# Patient Record
Sex: MALE | Race: WHITE | NOT HISPANIC OR LATINO | Employment: FULL TIME | URBAN - METROPOLITAN AREA
[De-identification: names, ages, dates, MRNs, and addresses within clinical notes are randomized per-mention and may not be internally consistent; named-entity substitution may affect disease eponyms.]

---

## 2017-10-11 ENCOUNTER — HOSPITAL ENCOUNTER (EMERGENCY)
Facility: HOSPITAL | Age: 22
Discharge: HOME | End: 2017-10-11
Attending: EMERGENCY MEDICINE
Payer: COMMERCIAL

## 2017-10-11 VITALS
TEMPERATURE: 98 F | BODY MASS INDEX: 26.66 KG/M2 | SYSTOLIC BLOOD PRESSURE: 145 MMHG | OXYGEN SATURATION: 100 % | HEIGHT: 69 IN | HEART RATE: 86 BPM | RESPIRATION RATE: 16 BRPM | DIASTOLIC BLOOD PRESSURE: 77 MMHG | WEIGHT: 180 LBS

## 2017-10-11 DIAGNOSIS — K92.1 BLACK STOOL: ICD-10-CM

## 2017-10-11 DIAGNOSIS — R10.13 EPIGASTRIC PAIN: Primary | ICD-10-CM

## 2017-10-11 LAB
ALBUMIN SERPL BCP-MCNC: 4.4 G/DL
ALP SERPL-CCNC: 76 U/L
ALT SERPL W/O P-5'-P-CCNC: 49 U/L
AMYLASE SERPL-CCNC: 57 U/L
ANION GAP SERPL CALC-SCNC: 9 MMOL/L
AST SERPL-CCNC: 27 U/L
BASOPHILS # BLD AUTO: 0.03 K/UL
BASOPHILS NFR BLD: 0.3 %
BILIRUB SERPL-MCNC: 0.9 MG/DL
BUN SERPL-MCNC: 19 MG/DL
CALCIUM SERPL-MCNC: 9.7 MG/DL
CHLORIDE SERPL-SCNC: 104 MMOL/L
CO2 SERPL-SCNC: 30 MMOL/L
CREAT SERPL-MCNC: 1.3 MG/DL
DIFFERENTIAL METHOD: ABNORMAL
EOSINOPHIL # BLD AUTO: 0.2 K/UL
EOSINOPHIL NFR BLD: 1.8 %
ERYTHROCYTE [DISTWIDTH] IN BLOOD BY AUTOMATED COUNT: 11.9 %
EST. GFR  (AFRICAN AMERICAN): >60 ML/MIN/1.73 M^2
EST. GFR  (NON AFRICAN AMERICAN): >60 ML/MIN/1.73 M^2
GLUCOSE SERPL-MCNC: 77 MG/DL
HCT VFR BLD AUTO: 46.6 %
HGB BLD-MCNC: 17.2 G/DL
INR PPP: 1
LIPASE SERPL-CCNC: 32 U/L
LYMPHOCYTES # BLD AUTO: 2.9 K/UL
LYMPHOCYTES NFR BLD: 28.5 %
MCH RBC QN AUTO: 30.9 PG
MCHC RBC AUTO-ENTMCNC: 36.9 G/DL
MCV RBC AUTO: 84 FL
MONOCYTES # BLD AUTO: 0.9 K/UL
MONOCYTES NFR BLD: 9 %
NEUTROPHILS # BLD AUTO: 6.2 K/UL
NEUTROPHILS NFR BLD: 60.4 %
PLATELET # BLD AUTO: 288 K/UL
PMV BLD AUTO: 9.1 FL
POTASSIUM SERPL-SCNC: 4.1 MMOL/L
PROT SERPL-MCNC: 7.8 G/DL
PROTHROMBIN TIME: 10.9 SEC
RBC # BLD AUTO: 5.56 M/UL
SODIUM SERPL-SCNC: 143 MMOL/L
WBC # BLD AUTO: 9.97 K/UL

## 2017-10-11 PROCEDURE — S0028 INJECTION, FAMOTIDINE, 20 MG: HCPCS | Performed by: EMERGENCY MEDICINE

## 2017-10-11 PROCEDURE — 96374 THER/PROPH/DIAG INJ IV PUSH: CPT

## 2017-10-11 PROCEDURE — 85610 PROTHROMBIN TIME: CPT

## 2017-10-11 PROCEDURE — 83690 ASSAY OF LIPASE: CPT

## 2017-10-11 PROCEDURE — 25500020 PHARM REV CODE 255: Performed by: EMERGENCY MEDICINE

## 2017-10-11 PROCEDURE — 80053 COMPREHEN METABOLIC PANEL: CPT

## 2017-10-11 PROCEDURE — 85025 COMPLETE CBC W/AUTO DIFF WBC: CPT

## 2017-10-11 PROCEDURE — 82150 ASSAY OF AMYLASE: CPT

## 2017-10-11 PROCEDURE — 25000003 PHARM REV CODE 250: Performed by: EMERGENCY MEDICINE

## 2017-10-11 PROCEDURE — 96361 HYDRATE IV INFUSION ADD-ON: CPT

## 2017-10-11 PROCEDURE — 99284 EMERGENCY DEPT VISIT MOD MDM: CPT | Mod: 25

## 2017-10-11 RX ORDER — METRONIDAZOLE 500 MG/1
500 TABLET ORAL EVERY 12 HOURS
Qty: 14 TABLET | Refills: 0 | Status: SHIPPED | OUTPATIENT
Start: 2017-10-11 | End: 2017-10-18

## 2017-10-11 RX ORDER — LOPERAMIDE HYDROCHLORIDE 2 MG/1
2 CAPSULE ORAL 4 TIMES DAILY PRN
Qty: 20 CAPSULE | Refills: 0 | Status: SHIPPED | OUTPATIENT
Start: 2017-10-11 | End: 2017-10-16

## 2017-10-11 RX ORDER — FAMOTIDINE 20 MG/1
20 TABLET, FILM COATED ORAL 2 TIMES DAILY
Qty: 20 TABLET | Refills: 0 | Status: ON HOLD | OUTPATIENT
Start: 2017-10-11 | End: 2020-12-07 | Stop reason: HOSPADM

## 2017-10-11 RX ORDER — DICYCLOMINE HYDROCHLORIDE 20 MG/1
20 TABLET ORAL 2 TIMES DAILY
Qty: 20 TABLET | Refills: 0 | Status: SHIPPED | OUTPATIENT
Start: 2017-10-11 | End: 2017-11-10

## 2017-10-11 RX ORDER — ONDANSETRON 4 MG/1
4 TABLET, ORALLY DISINTEGRATING ORAL EVERY 12 HOURS PRN
Qty: 12 TABLET | Refills: 0 | Status: SHIPPED | OUTPATIENT
Start: 2017-10-11 | End: 2017-10-14

## 2017-10-11 RX ORDER — FAMOTIDINE 10 MG/ML
20 INJECTION INTRAVENOUS
Status: COMPLETED | OUTPATIENT
Start: 2017-10-11 | End: 2017-10-11

## 2017-10-11 RX ADMIN — IOHEXOL 15 ML: 300 INJECTION, SOLUTION INTRAVENOUS at 10:10

## 2017-10-11 RX ADMIN — IOHEXOL 70 ML: 350 INJECTION, SOLUTION INTRAVENOUS at 10:10

## 2017-10-11 RX ADMIN — FAMOTIDINE 20 MG: 10 INJECTION, SOLUTION INTRAVENOUS at 09:10

## 2017-10-11 RX ADMIN — SODIUM CHLORIDE 1000 ML: 0.9 INJECTION, SOLUTION INTRAVENOUS at 10:10

## 2017-10-12 NOTE — ED TRIAGE NOTES
"Pt reports to ED via personal transportation with c/o "black diarrhea" and generalized stomach ache ongoing for 1 week; pt also reports 1 episode of dark black emesis 2 days ago; pt reports to having 5-7 bowel movements a day; pt denies taking any new medication, ASA, or Goodys powder; pt denies any hx with these symptoms; pt AAOx4  "

## 2017-10-12 NOTE — ED PROVIDER NOTES
"Encounter Date: 10/11/2017    SCRIBE #1 NOTE: I, Edd Abbott, am scribing for, and in the presence of,  Aris Nelson MD. I have scribed the following portions of the note - Other sections scribed: HPI, ROS.       History     Chief Complaint   Patient presents with    Rectal Bleeding     black stools with every BM x 1 week, feels weak, abd pain, vomiting 2 days ago; watery stools; denies fever     CC: Diarrhea; Dark Stools    HPI: This 21 y.o. male presents to the ED c/o diarrhea with dark stools for 1x week accompanied by lower abdominal pain, generalized fatigue, nausea, increased flatulence, and 1x episode of "black" emesis 2 days ago. Pt denies history of similar symptoms. Symptoms are acute in onset and severe (9/10). Pt denies any treatment with Ibuprofen or Pepto-Bismol. Pt denies any recent EtOH consumption. Pt denies history of stomach ulcers or hemorrhoids. Pt denies any fevers, sore throat, dysuria, or any other associated symptoms. Pt has no modifying factors. Pt has no prior treatment.       The history is provided by the patient. No  was used.     Review of patient's allergies indicates:  No Known Allergies  History reviewed. No pertinent past medical history.  Past Surgical History:   Procedure Laterality Date    WISDOM TOOTH EXTRACTION  2017     History reviewed. No pertinent family history.  Social History   Substance Use Topics    Smoking status: Never Smoker    Smokeless tobacco: Never Used    Alcohol use No     Review of Systems   Constitutional: Positive for fatigue. Negative for fever.   HENT: Negative for sore throat.    Respiratory: Negative for shortness of breath.    Cardiovascular: Negative for chest pain.   Gastrointestinal: Positive for abdominal pain (lower), diarrhea (with dark stool), nausea and vomiting (1x episode of "black" emesis).   Genitourinary: Negative for dysuria.   Musculoskeletal: Negative for back pain.   Skin: Negative for rash. "   Neurological: Negative for weakness.   Hematological: Does not bruise/bleed easily.       Physical Exam     Initial Vitals [10/11/17 1921]   BP Pulse Resp Temp SpO2   (!) 150/82 89 16 98.4 °F (36.9 °C) 98 %      MAP       104.67         Physical Exam    Vitals reviewed.  Constitutional: He appears well-developed and well-nourished.   HENT:   Head: Normocephalic and atraumatic.   Eyes: EOM are normal. Pupils are equal, round, and reactive to light.   Neck: Normal range of motion. Neck supple.   Cardiovascular: Normal rate, regular rhythm, normal heart sounds and intact distal pulses.   Pulmonary/Chest: Breath sounds normal. No respiratory distress. He has no wheezes. He has no rhonchi. He has no rales.   Abdominal: Soft. Bowel sounds are normal.   Genitourinary: Rectum normal. Rectal exam shows no external hemorrhoid, no internal hemorrhoid, no fissure, no tenderness, anal tone normal and guaiac negative stool. Guaiac negative stool. : Acceptable.  Musculoskeletal: Normal range of motion.   Neurological: He is alert and oriented to person, place, and time.   Skin: Skin is warm and dry.   Psychiatric: He has a normal mood and affect.         ED Course   Procedures  Labs Reviewed   CBC W/ AUTO DIFFERENTIAL - Abnormal; Notable for the following:        Result Value    MCHC 36.9 (*)     MPV 9.1 (*)     All other components within normal limits   COMPREHENSIVE METABOLIC PANEL - Abnormal; Notable for the following:     CO2 30 (*)     ALT 49 (*)     All other components within normal limits   LIPASE   PROTIME-INR   AMYLASE   POCT OCCULT BLOOD STOOL             Medical Decision Making:   History:   Old Medical Records: I decided to obtain old medical records.  Initial Assessment:   Medical decision-making:    The patient received a medical screening exam. If performed, the EKG was independently evaluated by me and is pending final cardiology evaluation.  If performed, all radiographic studies were  independently evaluated by me and are pending final radiology evaluation. If labs were ordered, they were reviewed. Vital signs are independently assessed by me.  If performed, the pulse oximetry was independently evaluated by me.  I decided to obtain the patient's past medical record.  If available, I reviewed the patient's past medical record, including most recent labs and radiology reports.    ED Management:  This is an emergent evaluation of the patient complaining of epigastric abdominal pain, black stool and vomiting.  My differential diagnosis includes mesenteric ischemia, obstruction, appendicitis, pancreatitis, cholecystitis, peptic ulcer disease,diverticulitis, colitis, volvulus, hernia, UTI, gastritis and gastroenteritis.    I decided to obtain and review the old medical record.    The patient does not clinically have gastroenteritis.  A CT scan was performed and was negative for mesenteric ischemia, obstruction, appendicitis, cholecystitis, diverticulitis, colitis, and hernia.  The patient's labs are otherwise normal. No anemia. Occult stool testing was negative.      The patient reports that symptomatically, symptoms have improved. No black stool in the ED. Tolerating PO. VS reassuring. FU with GI.    The results and physical exam findings were reviewed with the patient. Pt agrees with assessment, disposition and treatment plan and has no further questions or complaints at this time.      MITZY Nelson M.D. 10:47 PM 10/11/2017    In reviewing the results with the patient.  We've had further discussions regarding his gas.  He says it states it smells like rotten eggs.  He has frequent small burps and also smelling rotten eggs.  Patient states that he recently returned from a fishing and hunting trip.  He doesn't recall drinking any river or creek water.  I consider parasitic infection such as Giardia.  Will add on a short course of Flagyl.  Patient is still well appearing and in no acute distress  and does not require admission at this time.  I have given him strict return precautions.  On repeat abdominal exam is extremely soft, nontender, and showing no peritoneal or surgical abdomen signs.    The results and physical exam findings were reviewed with the patient. Pt agrees with assessment, disposition and treatment plan and has no further questions or complaints at this time.    MITZY Nelson M.D. 10:57 PM 10/11/2017              Scribe Attestation:   Scribe #1: I performed the above scribed service and the documentation accurately describes the services I performed. I attest to the accuracy of the note.    Attending Attestation:           Physician Attestation for Scribe:  Physician Attestation Statement for Scribe #1: I, Aris Nelson MD, reviewed documentation, as scribed by Edd Abbott in my presence, and it is both accurate and complete.                 ED Course      Clinical Impression:   The primary encounter diagnosis was Epigastric pain. A diagnosis of Black stool was also pertinent to this visit.                           Aris Nelson MD  10/11/17 9020       Aris Nelson MD  10/11/17 5841

## 2018-02-28 ENCOUNTER — OFFICE VISIT (OUTPATIENT)
Dept: URGENT CARE | Facility: CLINIC | Age: 23
End: 2018-02-28
Payer: COMMERCIAL

## 2018-02-28 VITALS
BODY MASS INDEX: 28.5 KG/M2 | DIASTOLIC BLOOD PRESSURE: 77 MMHG | WEIGHT: 193 LBS | TEMPERATURE: 100 F | HEART RATE: 81 BPM | OXYGEN SATURATION: 97 % | SYSTOLIC BLOOD PRESSURE: 127 MMHG

## 2018-02-28 DIAGNOSIS — L03.115 CELLULITIS OF RIGHT HIP: ICD-10-CM

## 2018-02-28 DIAGNOSIS — B07.9 VIRAL WART ON FINGER: ICD-10-CM

## 2018-02-28 DIAGNOSIS — L03.012 CELLULITIS OF LEFT MIDDLE FINGER: Primary | ICD-10-CM

## 2018-02-28 PROCEDURE — 99214 OFFICE O/P EST MOD 30 MIN: CPT | Mod: S$GLB,,, | Performed by: FAMILY MEDICINE

## 2018-02-28 RX ORDER — CHLORHEXIDINE GLUCONATE 40 MG/ML
SOLUTION TOPICAL DAILY PRN
Refills: 0 | Status: ON HOLD | COMMUNITY
Start: 2018-02-28 | End: 2020-12-06

## 2018-02-28 RX ORDER — SULFAMETHOXAZOLE AND TRIMETHOPRIM 800; 160 MG/1; MG/1
1 TABLET ORAL 2 TIMES DAILY
Qty: 20 TABLET | Refills: 0 | Status: SHIPPED | OUTPATIENT
Start: 2018-02-28 | End: 2018-03-10

## 2018-02-28 RX ORDER — MUPIROCIN 20 MG/G
OINTMENT TOPICAL
Qty: 22 G | Refills: 1 | Status: ON HOLD | OUTPATIENT
Start: 2018-02-28 | End: 2020-12-06

## 2018-03-01 NOTE — PROGRESS NOTES
Subjective:       Patient ID: Nathaniel Acharya is a 22 y.o. male.    Vitals:  weight is 87.5 kg (193 lb). His temperature is 100 °F (37.8 °C). His blood pressure is 127/77 and his pulse is 81. His oxygen saturation is 97%.     Chief Complaint: Abscess    Pt has an abscess on the right hip area. Pt tried to pop it and its red concerned about infection, been there for 3 days. Pt also had a blister on the left middle finger, very red and filled with fluid. The finger hurts bad like a 8 and has been like this for 7 days.      Abscess   Chronicity:  New  Abscess location: hip.  Associated Symptoms: a fever, no chills  Characteristics: redness      Review of Systems   Constitution: Positive for fever. Negative for chills.   HENT: Negative for sore throat.    Eyes: Negative for blurred vision.   Cardiovascular: Negative for chest pain.   Respiratory: Negative for shortness of breath.    Skin: Negative for rash.   Musculoskeletal: Negative for back pain and joint pain.   Gastrointestinal: Negative for abdominal pain, diarrhea, nausea and vomiting.   Neurological: Negative for headaches.   Psychiatric/Behavioral: The patient is not nervous/anxious.    All other systems reviewed and are negative.      Objective:      Physical Exam   Constitutional: He is oriented to person, place, and time. He appears well-developed and well-nourished.   HENT:   Head: Normocephalic and atraumatic.   Eyes: EOM are normal. Pupils are equal, round, and reactive to light.   Neck: Normal range of motion. Neck supple.   Cardiovascular: Normal rate and regular rhythm.    Pulmonary/Chest: Effort normal.   Abdominal: Soft.   Musculoskeletal: Normal range of motion.        Left hand: He exhibits swelling. He exhibits normal range of motion. Normal sensation noted.        Hands:  Erythema and edema surrounding left middle fingernail. Viral wart on the dorsum of the left index finger.   Neurological: He is alert and oriented to person, place, and time.    Skin: There is erythema.        Erythema dn edema on the right lateral hip       Assessment:       1. Cellulitis of left middle finger    2. Cellulitis of right hip    3. Viral wart on finger        Plan:         Cellulitis of left middle finger  -     mupirocin (BACTROBAN) 2 % ointment; Apply to affected area 3 times daily  Dispense: 22 g; Refill: 1  -     sulfamethoxazole-trimethoprim 800-160mg (BACTRIM DS) 800-160 mg Tab; Take 1 tablet by mouth 2 (two) times daily.  Dispense: 20 tablet; Refill: 0  -     chlorhexidine (HIBICLENS) 4 % external liquid; Apply topically daily as needed.; Refill: 0    Cellulitis of right hip  -     mupirocin (BACTROBAN) 2 % ointment; Apply to affected area 3 times daily  Dispense: 22 g; Refill: 1  -     sulfamethoxazole-trimethoprim 800-160mg (BACTRIM DS) 800-160 mg Tab; Take 1 tablet by mouth 2 (two) times daily.  Dispense: 20 tablet; Refill: 0  -     chlorhexidine (HIBICLENS) 4 % external liquid; Apply topically daily as needed.; Refill: 0    Viral wart on finger  -     Ambulatory referral to Dermatology      Patient Instructions     Paronychia of the Finger or Toe  Paronychia is an infection near a fingernail or toenail. It usually occurs when an opening in the cuticle or an ingrown toenail lets bacteria under the skin.  The infection will need to be drained if pus is present. If the infection has been caught early, you may need only antibiotic treatment. Healing will take about 1 to 2 weeks.  Home care  Follow these guidelines when caring for yourself at home:  · Clean and soak the toe or finger. Do this 2 times a day for the first 3 days. To do so:  ¨ Soak your foot or hand in a tub of warm water for 5 minutes. Or hold your toe or finger under a faucet of warm running water for 5 minutes.  ¨ Clean any crust away with soap and water using a cotton swab.  ¨ Put antibiotic ointment on the infected area.  · Change the dressing daily or any time it gets dirty.  · If you were given  antibiotics, take them as directed until they are all gone.  · If your infection is on a toe, wear comfortable shoes with a lot of toe room. You can also wear open-toed sandals while your toe heals.  · You may use over-the-counter medicine (acetaminophen or ibuprofen to help with pain, unless another medicine was prescribed. If you have chronic liver or kidney disease, talk with your healthcare provider before using these medicines. Also talk with your provider if you've had a stomach ulcer or GI (gastrointestinal) bleeding.  Prevention  The following can prevent paronychia:  · Avoid cutting or playing with your cuticles at home.  · Don't bite your nails.  · Don't suck on your thumbs or fingers.  Follow-up care  Follow up with your healthcare provider, or as advised.  When to seek medical advice  Call your healthcare provider right away if any of these occur:  · Redness, pain, or swelling of the finger or toe gets worse  · Red streaks in the skin leading away from the wound  · Pus or fluid draining from the nail area  · Fever of 100.4ºF (38ºC) or higher, or as directed by your provider  Date Last Reviewed: 8/1/2016 © 2000-2017 [employer]. [address] [place] [address]. All rights reserved. This information is not intended as a substitute for professional medical care. Always follow your healthcare professional's instructions.        Cellulitis  Cellulitis is an infection of the deep layers of skin. A break in the skin, such as a cut or scratch, can let bacteria under the skin. If the bacteria get to deep layers of the skin, it can be serious. If not treated, cellulitis can get into the bloodstream and lymph nodes. The infection can then spread throughout the body. This causes serious illness.  Cellulitis causes the affected skin to become red, swollen, warm, and sore. The reddened areas have a visible border. An open sore may leak fluid (pus). You may have a fever, chills, and  pain.  Cellulitis is treated with antibiotics taken for 7 to 10 days. An open sore may be cleaned and covered with cool wet gauze. Symptoms should get better 1 to 2 days after treatment is started. Make sure to take all the antibiotics for the full number of days until they are gone. Keep taking the medicine even if your symptoms go away.  Home care  Follow these tips:  · Limit the use of the part of your body with cellulitis.   · If the infection is on your leg, keep your leg raised while sitting. This will help to reduce swelling.  · Take all of the antibiotic medicine exactly as directed until it is gone. Do not miss any doses, especially during the first 7 days. Dont stop taking the medicine when your symptoms get better.  · Keep the affected area clean and dry.  · Wash your hands with soap and warm water before and after touching your skin. Anyone else who touches your skin should also wash his or her hands. Don't share towels.  Follow-up care  Follow up with your healthcare provider, or as advised. If your infection does not go away on the first antibiotic, your healthcare provider will prescribe a different one.  When to seek medical advice  Call your healthcare provider right away if any of these occur:  · Red areas that spread  · Swelling or pain that gets worse  · Fluid leaking from the skin (pus)  · Fever higher of 100.4º F (38.0º C) or higher after 2 days on antibiotics  Date Last Reviewed: 9/1/2016  © 4209-9450 [employer]. [address] [place] [address], [place] [address]. All rights reserved. This information is not intended as a substitute for professional medical care. Always follow your healthcare professional's instructions.

## 2018-03-01 NOTE — PATIENT INSTRUCTIONS

## 2019-05-15 ENCOUNTER — HOSPITAL ENCOUNTER (EMERGENCY)
Facility: HOSPITAL | Age: 24
Discharge: HOME | End: 2019-05-15
Attending: EMERGENCY MEDICINE
Payer: COMMERCIAL

## 2019-05-15 VITALS
HEART RATE: 109 BPM | RESPIRATION RATE: 19 BRPM | WEIGHT: 200 LBS | DIASTOLIC BLOOD PRESSURE: 95 MMHG | BODY MASS INDEX: 29.62 KG/M2 | TEMPERATURE: 100 F | HEIGHT: 69 IN | OXYGEN SATURATION: 100 % | SYSTOLIC BLOOD PRESSURE: 147 MMHG

## 2019-05-15 DIAGNOSIS — J02.9 EXUDATIVE PHARYNGITIS: Primary | ICD-10-CM

## 2019-05-15 DIAGNOSIS — K52.9 GASTROENTERITIS: ICD-10-CM

## 2019-05-15 LAB
ALBUMIN SERPL-MCNC: 4.2 G/DL (ref 3.3–5.5)
ALBUMIN SERPL-MCNC: 4.2 G/DL (ref 3.3–5.5)
ALP SERPL-CCNC: 73 U/L (ref 42–141)
ALP SERPL-CCNC: 74 U/L (ref 42–141)
BILIRUB SERPL-MCNC: 1.5 MG/DL (ref 0.2–1.6)
BILIRUB SERPL-MCNC: 1.6 MG/DL (ref 0.2–1.6)
BUN SERPL-MCNC: 8 MG/DL (ref 7–22)
CALCIUM SERPL-MCNC: 9.3 MG/DL (ref 8–10.3)
CHLORIDE SERPL-SCNC: 104 MMOL/L (ref 98–108)
CREAT SERPL-MCNC: 0.9 MG/DL (ref 0.6–1.2)
CTP QC/QA: YES
CTP QC/QA: YES
FLUAV AG NPH QL: NEGATIVE
FLUBV AG NPH QL: NEGATIVE
GLUCOSE SERPL-MCNC: 100 MG/DL (ref 73–118)
POC ALT (SGPT): 28 U/L (ref 10–47)
POC ALT (SGPT): 36 U/L (ref 10–47)
POC AMYLASE: 37 U/L (ref 14–97)
POC AST (SGOT): 34 U/L (ref 11–38)
POC AST (SGOT): 35 U/L (ref 11–38)
POC GGT: 49 U/L (ref 5–65)
POC TCO2: 30 MMOL/L (ref 18–33)
POTASSIUM BLD-SCNC: 4 MMOL/L (ref 3.6–5.1)
PROTEIN, POC: 7.7 G/DL (ref 6.4–8.1)
PROTEIN, POC: 7.8 G/DL (ref 6.4–8.1)
S PYO RRNA THROAT QL PROBE: NEGATIVE
SODIUM BLD-SCNC: 140 MMOL/L (ref 128–145)

## 2019-05-15 PROCEDURE — 87081 CULTURE SCREEN ONLY: CPT

## 2019-05-15 PROCEDURE — 96374 THER/PROPH/DIAG INJ IV PUSH: CPT | Mod: ER

## 2019-05-15 PROCEDURE — 87804 INFLUENZA ASSAY W/OPTIC: CPT | Mod: ER

## 2019-05-15 PROCEDURE — 82150 ASSAY OF AMYLASE: CPT | Mod: ER

## 2019-05-15 PROCEDURE — 96375 TX/PRO/DX INJ NEW DRUG ADDON: CPT | Mod: ER

## 2019-05-15 PROCEDURE — 80053 COMPREHEN METABOLIC PANEL: CPT | Mod: ER

## 2019-05-15 PROCEDURE — 96361 HYDRATE IV INFUSION ADD-ON: CPT | Mod: ER

## 2019-05-15 PROCEDURE — 85025 COMPLETE CBC W/AUTO DIFF WBC: CPT | Mod: ER

## 2019-05-15 PROCEDURE — S0028 INJECTION, FAMOTIDINE, 20 MG: HCPCS | Mod: ER | Performed by: EMERGENCY MEDICINE

## 2019-05-15 PROCEDURE — 25000003 PHARM REV CODE 250: Mod: ER | Performed by: EMERGENCY MEDICINE

## 2019-05-15 PROCEDURE — 96372 THER/PROPH/DIAG INJ SC/IM: CPT | Mod: ER

## 2019-05-15 PROCEDURE — 63600175 PHARM REV CODE 636 W HCPCS: Mod: ER | Performed by: EMERGENCY MEDICINE

## 2019-05-15 PROCEDURE — 99284 EMERGENCY DEPT VISIT MOD MDM: CPT | Mod: 25,ER

## 2019-05-15 PROCEDURE — 87880 STREP A ASSAY W/OPTIC: CPT | Mod: ER

## 2019-05-15 RX ORDER — PROMETHAZINE HYDROCHLORIDE AND DEXTROMETHORPHAN HYDROBROMIDE 6.25; 15 MG/5ML; MG/5ML
5 SYRUP ORAL 3 TIMES DAILY PRN
Qty: 200 ML | Refills: 0 | Status: SHIPPED | OUTPATIENT
Start: 2019-05-15 | End: 2019-05-25

## 2019-05-15 RX ORDER — ACETAMINOPHEN 500 MG
1000 TABLET ORAL EVERY 6 HOURS PRN
Qty: 30 TABLET | Refills: 0 | Status: ON HOLD | OUTPATIENT
Start: 2019-05-15 | End: 2020-12-06

## 2019-05-15 RX ORDER — LIDOCAINE HYDROCHLORIDE 20 MG/ML
SOLUTION OROPHARYNGEAL
Qty: 30 ML | Refills: 0 | Status: ON HOLD | OUTPATIENT
Start: 2019-05-15 | End: 2020-12-06

## 2019-05-15 RX ORDER — DIPHENHYDRAMINE HYDROCHLORIDE 50 MG/ML
25 INJECTION INTRAMUSCULAR; INTRAVENOUS
Status: COMPLETED | OUTPATIENT
Start: 2019-05-15 | End: 2019-05-15

## 2019-05-15 RX ORDER — IBUPROFEN 600 MG/1
600 TABLET ORAL EVERY 6 HOURS PRN
Qty: 20 TABLET | Refills: 0 | Status: ON HOLD | OUTPATIENT
Start: 2019-05-15 | End: 2020-12-06

## 2019-05-15 RX ORDER — PROMETHAZINE HYDROCHLORIDE 25 MG/1
25 SUPPOSITORY RECTAL EVERY 6 HOURS PRN
Qty: 10 SUPPOSITORY | Refills: 0 | Status: ON HOLD | OUTPATIENT
Start: 2019-05-15 | End: 2020-12-06

## 2019-05-15 RX ORDER — PROMETHAZINE HYDROCHLORIDE 25 MG/ML
25 INJECTION, SOLUTION INTRAMUSCULAR; INTRAVENOUS
Status: COMPLETED | OUTPATIENT
Start: 2019-05-15 | End: 2019-05-15

## 2019-05-15 RX ORDER — FLUTICASONE PROPIONATE 50 MCG
1 SPRAY, SUSPENSION (ML) NASAL 2 TIMES DAILY
Qty: 1 BOTTLE | Refills: 0 | Status: ON HOLD | OUTPATIENT
Start: 2019-05-15 | End: 2020-12-07 | Stop reason: HOSPADM

## 2019-05-15 RX ORDER — KETOROLAC TROMETHAMINE 30 MG/ML
15 INJECTION, SOLUTION INTRAMUSCULAR; INTRAVENOUS
Status: COMPLETED | OUTPATIENT
Start: 2019-05-15 | End: 2019-05-15

## 2019-05-15 RX ORDER — FAMOTIDINE 10 MG/ML
40 INJECTION INTRAVENOUS
Status: COMPLETED | OUTPATIENT
Start: 2019-05-15 | End: 2019-05-15

## 2019-05-15 RX ADMIN — PROMETHAZINE HYDROCHLORIDE 25 MG: 25 INJECTION INTRAMUSCULAR; INTRAVENOUS at 11:05

## 2019-05-15 RX ADMIN — KETOROLAC TROMETHAMINE 15 MG: 30 INJECTION, SOLUTION INTRAMUSCULAR at 10:05

## 2019-05-15 RX ADMIN — PENICILLIN G BENZATHINE 1.2 MILLION UNITS: 1200000 INJECTION, SUSPENSION INTRAMUSCULAR at 11:05

## 2019-05-15 RX ADMIN — DIPHENHYDRAMINE HYDROCHLORIDE 25 MG: 50 INJECTION, SOLUTION INTRAMUSCULAR; INTRAVENOUS at 11:05

## 2019-05-15 RX ADMIN — SODIUM CHLORIDE 1000 ML: 0.9 INJECTION, SOLUTION INTRAVENOUS at 10:05

## 2019-05-15 RX ADMIN — FAMOTIDINE 40 MG: 10 INJECTION INTRAVENOUS at 10:05

## 2019-05-15 NOTE — ED PROVIDER NOTES
Encounter Date: 5/15/2019    SCRIBE #1 NOTE: I, Kari Jordan, am scribing for, and in the presence of, Dr. Brown.       History     Chief Complaint   Patient presents with    Vomiting     onset yesterday, multiple times    Cough     onset yesterday, states now coughting up blood    Nasal Congestion     Nathaniel Acharya is a 23 y.o. male who presents to the ED complaining of sore throat that began yesterday. Pt endorses cough with blood streaked yellow sputum, congestion, nausea, vomiting, diarrhea, fever (101), and sleep disturbance.  Patient states he has been having nausea vomiting and diarrhea for 2 days.  Crampy belly pain.  Pt reports he has not taken any medication for symptoms. He states his entire family had strep throat last week. Pt denies use of alcohol, tobacco, and illicit drugs.     Pt is requesting phenergan-codeine as he states his family members with similar symptoms took it with rapid relief.    The history is provided by the patient. No  was used.     Review of patient's allergies indicates:  No Known Allergies  Past Medical History:   Diagnosis Date    Anxiety     PTSD (post-traumatic stress disorder)      Past Surgical History:   Procedure Laterality Date    WISDOM TOOTH EXTRACTION  2017     History reviewed. No pertinent family history.  Social History     Tobacco Use    Smoking status: Never Smoker    Smokeless tobacco: Never Used   Substance Use Topics    Alcohol use: No    Drug use: No     Review of Systems   Constitutional: Positive for fever (101).   HENT: Positive for congestion and sore throat.    Respiratory: Positive for cough (with blood streaked yellow sputum). Negative for shortness of breath.    Cardiovascular: Negative for chest pain.   Gastrointestinal: Positive for diarrhea, nausea and vomiting.   Genitourinary: Negative for dysuria.   Musculoskeletal: Negative for back pain.   Skin: Negative for rash.   Neurological: Negative for weakness.    Hematological: Does not bruise/bleed easily.   Psychiatric/Behavioral: Positive for sleep disturbance.   All other systems reviewed and are negative.      Physical Exam     Initial Vitals [05/15/19 0947]   BP Pulse Resp Temp SpO2   (!) 148/81 95 19 99.6 °F (37.6 °C) 97 %      MAP       --         Physical Exam    Nursing note and vitals reviewed.  Constitutional: He appears well-developed and well-nourished. He is not diaphoretic. No distress.   HENT:   Head: Normocephalic and atraumatic.   Right Ear: Tympanic membrane and external ear normal.   Left Ear: Tympanic membrane and external ear normal.   Nose: Mucosal edema and rhinorrhea present.   Mouth/Throat: Uvula is midline. Oropharyngeal exudate, posterior oropharyngeal edema and posterior oropharyngeal erythema present.   Eyes: EOM are normal. Pupils are equal, round, and reactive to light. Right eye exhibits no discharge. Left eye exhibits no discharge.   Neck: Normal range of motion. Neck supple.   Cardiovascular: Normal rate, regular rhythm, normal heart sounds and intact distal pulses. Exam reveals no gallop and no friction rub.    No murmur heard.  Pulmonary/Chest: Breath sounds normal. No respiratory distress. He has no wheezes. He has no rhonchi. He has no rales. He exhibits no tenderness.   Abdominal: Soft. Bowel sounds are normal. He exhibits no distension and no mass. There is no tenderness. There is no rebound and no guarding.   Musculoskeletal: Normal range of motion.   Neurological: He is alert and oriented to person, place, and time. No cranial nerve deficit or sensory deficit. GCS score is 15. GCS eye subscore is 4. GCS verbal subscore is 5. GCS motor subscore is 6.   Skin: Skin is warm and dry. No rash noted. No pallor.   Psychiatric: He has a normal mood and affect.         ED Course   Procedures  Labs Reviewed   CULTURE, STREP A,  THROAT   POCT INFLUENZA A/B   POCT RAPID STREP A   POCT CBC   POCT CMP   POCT AMYLASE   POCT LIVER PANEL   POCT  CMP     CBC white blood cell count 10.4, hemoglobin 15.9, hematocrit 46.3 and platelets 198.  Amylase is 37.  CMP sodium 140, potassium 4, bicarb 30, chloride 104, glucose 100, BUN 8 and creatinine 0.9         Imaging Results          X-Ray Chest PA And Lateral (Final result)  Result time 05/15/19 10:38:01    Final result by Lorenza Weiner MD (05/15/19 10:38:01)                 Impression:      No acute abnormality.      Electronically signed by: Lorenza Weiner MD  Date:    05/15/2019  Time:    10:38             Narrative:    EXAMINATION:  XR CHEST PA AND LATERAL    CLINICAL HISTORY:  pain;    TECHNIQUE:  PA and lateral views of the chest were performed.    COMPARISON:  None    FINDINGS:  The lungs are clear, with normal appearance of pulmonary vasculature and no pleural effusion or pneumothorax.    The cardiac silhouette is normal in size. The hilar and mediastinal contours are unremarkable.    Bones are intact.                                 Medical Decision Making:   Clinical Tests:   Lab Tests: Ordered and Reviewed  Radiological Study: Ordered and Reviewed    Medical decision making   Chief complaint: sore throat  Treatment in the ED Physical Exam, Phenergan, Benadryl, Bicillin, Toradol, Pepcid, and IV fluids  Patient reports feeling much better after medication.    Patient tolerating p.o. without difficulty.  Patient continues to request Phenergan with codeine.  Discussed with patient I feel Phenergan DM is a better choice as it is non addictive.  Discussed labs, and imaging results with the patient.  Fill and take prescriptions as directed.  Return to the ED if symptoms worsen or do not resolve.   Answered questions and discussed discharge plan.    Patient feels better and is ready for discharge.  Follow up with PCP/specialist in 1 day.            Scribe Attestation:   Scribe #1: I performed the above scribed service and the documentation accurately describes the services I performed. I attest to the  accuracy of the note.     I, Dr. Ngoc Brown, personally performed the services described in this documentation. This document was produced by a scribe under my direction and in my presence. All medical record entries made by the scribe were at my direction and in my presence.  I have reviewed the chart and agree that the record reflects my personal performance and is accurate and complete. gNoc Brown DO.     05/15/2019 12:09 PM             Clinical Impression:     1. Exudative pharyngitis    2. Gastroenteritis                                   Ngoc Brown,   05/15/19 1212       Ngoc Brown,   05/15/19 6640

## 2019-05-17 LAB — BACTERIA THROAT CULT: NORMAL

## 2020-12-05 ENCOUNTER — HOSPITAL ENCOUNTER (EMERGENCY)
Facility: HOSPITAL | Age: 25
Discharge: PSYCHIATRIC OR SUBSTANCE USE FACILITY | End: 2020-12-06
Attending: EMERGENCY MEDICINE
Payer: COMMERCIAL

## 2020-12-05 DIAGNOSIS — Z00.8 MEDICAL CLEARANCE FOR PSYCHIATRIC ADMISSION: ICD-10-CM

## 2020-12-05 DIAGNOSIS — R45.851 SUICIDAL IDEATION: Primary | ICD-10-CM

## 2020-12-05 LAB
ALBUMIN SERPL BCP-MCNC: 4.2 G/DL (ref 3.5–5.2)
ALP SERPL-CCNC: 81 U/L (ref 55–135)
ALT SERPL W/O P-5'-P-CCNC: 48 U/L (ref 10–44)
AMPHET+METHAMPHET UR QL: NEGATIVE
ANION GAP SERPL CALC-SCNC: 11 MMOL/L (ref 8–16)
APAP SERPL-MCNC: <3 UG/ML (ref 10–20)
AST SERPL-CCNC: 31 U/L (ref 10–40)
BARBITURATES UR QL SCN>200 NG/ML: NEGATIVE
BASOPHILS # BLD AUTO: 0.02 K/UL (ref 0–0.2)
BASOPHILS NFR BLD: 0.2 % (ref 0–1.9)
BENZODIAZ UR QL SCN>200 NG/ML: NORMAL
BILIRUB SERPL-MCNC: 0.6 MG/DL (ref 0.1–1)
BILIRUB UR QL STRIP: NEGATIVE
BUN SERPL-MCNC: 17 MG/DL (ref 6–20)
BZE UR QL SCN: NEGATIVE
CALCIUM SERPL-MCNC: 8.9 MG/DL (ref 8.7–10.5)
CANNABINOIDS UR QL SCN: NORMAL
CHLORIDE SERPL-SCNC: 106 MMOL/L (ref 95–110)
CLARITY UR REFRACT.AUTO: CLEAR
CO2 SERPL-SCNC: 24 MMOL/L (ref 23–29)
COLOR UR AUTO: YELLOW
CREAT SERPL-MCNC: 1.2 MG/DL (ref 0.5–1.4)
CREAT UR-MCNC: 212 MG/DL (ref 23–375)
CTP QC/QA: YES
DIFFERENTIAL METHOD: ABNORMAL
EOSINOPHIL # BLD AUTO: 0.1 K/UL (ref 0–0.5)
EOSINOPHIL NFR BLD: 0.8 % (ref 0–8)
ERYTHROCYTE [DISTWIDTH] IN BLOOD BY AUTOMATED COUNT: 11.9 % (ref 11.5–14.5)
EST. GFR  (AFRICAN AMERICAN): >60 ML/MIN/1.73 M^2
EST. GFR  (NON AFRICAN AMERICAN): >60 ML/MIN/1.73 M^2
ETHANOL SERPL-MCNC: <10 MG/DL
GLUCOSE SERPL-MCNC: 147 MG/DL (ref 70–110)
GLUCOSE UR QL STRIP: NEGATIVE
HCT VFR BLD AUTO: 44.7 % (ref 40–54)
HGB BLD-MCNC: 15 G/DL (ref 14–18)
HGB UR QL STRIP: NEGATIVE
IMM GRANULOCYTES # BLD AUTO: 0.04 K/UL (ref 0–0.04)
IMM GRANULOCYTES NFR BLD AUTO: 0.4 % (ref 0–0.5)
KETONES UR QL STRIP: NEGATIVE
LEUKOCYTE ESTERASE UR QL STRIP: NEGATIVE
LYMPHOCYTES # BLD AUTO: 2.1 K/UL (ref 1–4.8)
LYMPHOCYTES NFR BLD: 19.7 % (ref 18–48)
MCH RBC QN AUTO: 29.4 PG (ref 27–31)
MCHC RBC AUTO-ENTMCNC: 33.6 G/DL (ref 32–36)
MCV RBC AUTO: 88 FL (ref 82–98)
METHADONE UR QL SCN>300 NG/ML: NEGATIVE
MONOCYTES # BLD AUTO: 0.5 K/UL (ref 0.3–1)
MONOCYTES NFR BLD: 4.6 % (ref 4–15)
NEUTROPHILS # BLD AUTO: 7.9 K/UL (ref 1.8–7.7)
NEUTROPHILS NFR BLD: 74.3 % (ref 38–73)
NITRITE UR QL STRIP: NEGATIVE
NRBC BLD-RTO: 0 /100 WBC
OPIATES UR QL SCN: NEGATIVE
PCP UR QL SCN>25 NG/ML: NEGATIVE
PH UR STRIP: 6 [PH] (ref 5–8)
PLATELET # BLD AUTO: 276 K/UL (ref 150–350)
PMV BLD AUTO: 9.7 FL (ref 9.2–12.9)
POTASSIUM SERPL-SCNC: 3.8 MMOL/L (ref 3.5–5.1)
PROT SERPL-MCNC: 7.4 G/DL (ref 6–8.4)
PROT UR QL STRIP: NEGATIVE
RBC # BLD AUTO: 5.11 M/UL (ref 4.6–6.2)
SARS-COV-2 RDRP RESP QL NAA+PROBE: NEGATIVE
SODIUM SERPL-SCNC: 141 MMOL/L (ref 136–145)
SP GR UR STRIP: 1.02 (ref 1–1.03)
TOXICOLOGY INFORMATION: NORMAL
TSH SERPL DL<=0.005 MIU/L-ACNC: 2.4 UIU/ML (ref 0.4–4)
URN SPEC COLLECT METH UR: NORMAL
WBC # BLD AUTO: 10.61 K/UL (ref 3.9–12.7)

## 2020-12-05 PROCEDURE — 80053 COMPREHEN METABOLIC PANEL: CPT

## 2020-12-05 PROCEDURE — 85025 COMPLETE CBC W/AUTO DIFF WBC: CPT

## 2020-12-05 PROCEDURE — 81003 URINALYSIS AUTO W/O SCOPE: CPT

## 2020-12-05 PROCEDURE — 84443 ASSAY THYROID STIM HORMONE: CPT

## 2020-12-05 PROCEDURE — 99284 EMERGENCY DEPT VISIT MOD MDM: CPT | Mod: ,,, | Performed by: EMERGENCY MEDICINE

## 2020-12-05 PROCEDURE — 80329 ANALGESICS NON-OPIOID 1 OR 2: CPT

## 2020-12-05 PROCEDURE — U0002 COVID-19 LAB TEST NON-CDC: HCPCS | Performed by: EMERGENCY MEDICINE

## 2020-12-05 PROCEDURE — 80320 DRUG SCREEN QUANTALCOHOLS: CPT

## 2020-12-05 PROCEDURE — 80307 DRUG TEST PRSMV CHEM ANLYZR: CPT

## 2020-12-05 PROCEDURE — 99284 PR EMERGENCY DEPT VISIT,LEVEL IV: ICD-10-PCS | Mod: ,,, | Performed by: EMERGENCY MEDICINE

## 2020-12-06 ENCOUNTER — HOSPITAL ENCOUNTER (INPATIENT)
Facility: HOSPITAL | Age: 25
LOS: 2 days | Discharge: HOME | DRG: 882 | End: 2020-12-08
Attending: PSYCHIATRY & NEUROLOGY | Admitting: PSYCHIATRY & NEUROLOGY
Payer: COMMERCIAL

## 2020-12-06 VITALS
HEART RATE: 75 BPM | WEIGHT: 240 LBS | DIASTOLIC BLOOD PRESSURE: 83 MMHG | BODY MASS INDEX: 35.55 KG/M2 | RESPIRATION RATE: 20 BRPM | OXYGEN SATURATION: 97 % | SYSTOLIC BLOOD PRESSURE: 134 MMHG | TEMPERATURE: 98 F | HEIGHT: 69 IN

## 2020-12-06 DIAGNOSIS — F33.2 MDD (MAJOR DEPRESSIVE DISORDER), RECURRENT SEVERE, WITHOUT PSYCHOSIS: Primary | ICD-10-CM

## 2020-12-06 DIAGNOSIS — R74.8 ELEVATED LIVER ENZYMES: ICD-10-CM

## 2020-12-06 DIAGNOSIS — F32.A DEPRESSION: ICD-10-CM

## 2020-12-06 LAB
CHOLEST SERPL-MCNC: 206 MG/DL (ref 120–199)
CHOLEST/HDLC SERPL: 5.6 {RATIO} (ref 2–5)
ESTIMATED AVG GLUCOSE: 94 MG/DL (ref 68–131)
HBA1C MFR BLD HPLC: 4.9 % (ref 4–5.6)
HDLC SERPL-MCNC: 37 MG/DL (ref 40–75)
HDLC SERPL: 18 % (ref 20–50)
LDLC SERPL CALC-MCNC: 144.4 MG/DL (ref 63–159)
NONHDLC SERPL-MCNC: 169 MG/DL
TRIGL SERPL-MCNC: 123 MG/DL (ref 30–150)

## 2020-12-06 PROCEDURE — 99285 EMERGENCY DEPT VISIT HI MDM: CPT | Mod: 25

## 2020-12-06 PROCEDURE — 99213 PR OFFICE/OUTPT VISIT, EST, LEVL III, 20-29 MIN: ICD-10-PCS | Mod: ,,, | Performed by: STUDENT IN AN ORGANIZED HEALTH CARE EDUCATION/TRAINING PROGRAM

## 2020-12-06 PROCEDURE — 99213 OFFICE O/P EST LOW 20 MIN: CPT | Mod: ,,, | Performed by: STUDENT IN AN ORGANIZED HEALTH CARE EDUCATION/TRAINING PROGRAM

## 2020-12-06 PROCEDURE — 99223 1ST HOSP IP/OBS HIGH 75: CPT | Mod: ,,, | Performed by: PSYCHIATRY & NEUROLOGY

## 2020-12-06 PROCEDURE — 80061 LIPID PANEL: CPT

## 2020-12-06 PROCEDURE — 11400000 HC PSYCH PRIVATE ROOM

## 2020-12-06 PROCEDURE — 99223 PR INITIAL HOSPITAL CARE,LEVL III: ICD-10-PCS | Mod: ,,, | Performed by: PSYCHIATRY & NEUROLOGY

## 2020-12-06 PROCEDURE — 83036 HEMOGLOBIN GLYCOSYLATED A1C: CPT

## 2020-12-06 PROCEDURE — 25000003 PHARM REV CODE 250: Performed by: PSYCHIATRY & NEUROLOGY

## 2020-12-06 PROCEDURE — 36415 COLL VENOUS BLD VENIPUNCTURE: CPT

## 2020-12-06 PROCEDURE — 80074 ACUTE HEPATITIS PANEL: CPT

## 2020-12-06 RX ORDER — OLANZAPINE 10 MG/2ML
10 INJECTION, POWDER, FOR SOLUTION INTRAMUSCULAR EVERY 6 HOURS PRN
Status: DISCONTINUED | OUTPATIENT
Start: 2020-12-06 | End: 2020-12-06

## 2020-12-06 RX ORDER — OLANZAPINE 10 MG/1
10 TABLET ORAL EVERY 4 HOURS PRN
Status: DISCONTINUED | OUTPATIENT
Start: 2020-12-06 | End: 2020-12-08 | Stop reason: HOSPADM

## 2020-12-06 RX ORDER — DOCUSATE SODIUM 100 MG/1
100 CAPSULE, LIQUID FILLED ORAL DAILY PRN
Status: DISCONTINUED | OUTPATIENT
Start: 2020-12-06 | End: 2020-12-08 | Stop reason: HOSPADM

## 2020-12-06 RX ORDER — LOPERAMIDE HYDROCHLORIDE 2 MG/1
2 CAPSULE ORAL
Status: DISCONTINUED | OUTPATIENT
Start: 2020-12-06 | End: 2020-12-08 | Stop reason: HOSPADM

## 2020-12-06 RX ORDER — OLANZAPINE 10 MG/2ML
10 INJECTION, POWDER, FOR SOLUTION INTRAMUSCULAR EVERY 4 HOURS PRN
Status: DISCONTINUED | OUTPATIENT
Start: 2020-12-06 | End: 2020-12-08 | Stop reason: HOSPADM

## 2020-12-06 RX ORDER — MUPIROCIN 20 MG/G
OINTMENT TOPICAL 2 TIMES DAILY
Status: DISCONTINUED | OUTPATIENT
Start: 2020-12-06 | End: 2020-12-08 | Stop reason: HOSPADM

## 2020-12-06 RX ORDER — MAG HYDROX/ALUMINUM HYD/SIMETH 200-200-20
30 SUSPENSION, ORAL (FINAL DOSE FORM) ORAL EVERY 6 HOURS PRN
Status: DISCONTINUED | OUTPATIENT
Start: 2020-12-06 | End: 2020-12-08 | Stop reason: HOSPADM

## 2020-12-06 RX ORDER — OLANZAPINE 10 MG/1
10 TABLET ORAL EVERY 6 HOURS PRN
Status: DISCONTINUED | OUTPATIENT
Start: 2020-12-06 | End: 2020-12-06

## 2020-12-06 RX ORDER — FOLIC ACID 1 MG/1
1 TABLET ORAL DAILY
Status: DISCONTINUED | OUTPATIENT
Start: 2020-12-06 | End: 2020-12-08 | Stop reason: HOSPADM

## 2020-12-06 RX ORDER — HYDROXYZINE PAMOATE 50 MG/1
50 CAPSULE ORAL EVERY 6 HOURS PRN
Status: DISCONTINUED | OUTPATIENT
Start: 2020-12-06 | End: 2020-12-08 | Stop reason: HOSPADM

## 2020-12-06 RX ORDER — ACETAMINOPHEN 325 MG/1
650 TABLET ORAL EVERY 6 HOURS PRN
Status: DISCONTINUED | OUTPATIENT
Start: 2020-12-06 | End: 2020-12-08 | Stop reason: HOSPADM

## 2020-12-06 RX ORDER — HYDROXYZINE PAMOATE 50 MG/1
50 CAPSULE ORAL EVERY 6 HOURS PRN
Status: DISCONTINUED | OUTPATIENT
Start: 2020-12-06 | End: 2020-12-06

## 2020-12-06 RX ADMIN — OLANZAPINE 10 MG: 10 TABLET, FILM COATED ORAL at 06:12

## 2020-12-06 RX ADMIN — OLANZAPINE 10 MG: 10 TABLET, FILM COATED ORAL at 05:12

## 2020-12-06 RX ADMIN — HYDROXYZINE PAMOATE 50 MG: 50 CAPSULE ORAL at 08:12

## 2020-12-06 NOTE — ED NOTES
The patient presented to the [facility] escorted by hospital security, ED sitter & ED tech via w/c. He is attired in blue, paper hospital scrubs w/ fair grooming & hygiene. Checked for contraband, none found. His affect is blunted, mood depressed. He is cooperative w/ the interview process. He presented w/ a back pack & a belongings bag. [facility] ED tech inventoried bags for contraband & valuables & initiated a valuable envelope # [id number]. He presents on a PEC written by Dr. Henderson on 12/05/2020 @ 2049. The patient denies SI but does endorse depression secondary to losing his job. He denies HI, A/VH. He is maintained on DVC for safety. Plan of care explained, no questions, acknowledged understanding.

## 2020-12-06 NOTE — ED NOTES
Continues to appear asleep, eyes closed, rise/fall of chest noted. Maintained on DVC. Awaiting transportation.

## 2020-12-06 NOTE — NURSING
Pt up to unit per w/c, accompanied by security and [employer].  Wanded.  No contraband found.  Ambulated onto unit

## 2020-12-06 NOTE — SUBJECTIVE & OBJECTIVE
Past Medical History:   Diagnosis Date    Anxiety     Depression     Hx of psychiatric care     PTSD (post-traumatic stress disorder)     Therapy        Past Surgical History:   Procedure Laterality Date    WISDOM TOOTH EXTRACTION  2017       Review of patient's allergies indicates:  No Known Allergies    No current facility-administered medications on file prior to encounter.      Current Outpatient Medications on File Prior to Encounter   Medication Sig    famotidine (PEPCID) 20 MG tablet Take 1 tablet (20 mg total) by mouth 2 (two) times daily.    fluticasone propionate (FLONASE) 50 mcg/actuation nasal spray 1 spray (50 mcg total) by Each Nare route 2 (two) times daily.    [DISCONTINUED] acetaminophen (TYLENOL) 500 MG tablet Take 2 tablets (1,000 mg total) by mouth every 6 (six) hours as needed for Pain.    [DISCONTINUED] chlorhexidine (HIBICLENS) 4 % external liquid Apply topically daily as needed.    [DISCONTINUED] ibuprofen (ADVIL,MOTRIN) 600 MG tablet Take 1 tablet (600 mg total) by mouth every 6 (six) hours as needed for Pain (Take with food as needed for mild-to-moderate pain).    [DISCONTINUED] lidocaine HCl 2% (XYLOCAINE) 2 % Soln by Mucous Membrane route every 3 (three) hours. Apply 5 mL to painful area with syringe or tsp    [DISCONTINUED] mupirocin (BACTROBAN) 2 % ointment Apply to affected area 3 times daily    [DISCONTINUED] promethazine (PHENERGAN) 25 MG suppository Place 1 suppository (25 mg total) rectally every 6 (six) hours as needed (Use if  nausea not controlled with oral medication).     Family History     None        Tobacco Use    Smoking status: Never Smoker    Smokeless tobacco: Never Used   Substance and Sexual Activity    Alcohol use: No    Drug use: No     Comment: denies drug use    Sexual activity: Yes     Partners: Female     Review of Systems   Unable to perform ROS: Psychiatric disorder     Objective:     Vital Signs (Most Recent):  Temp: 97.4 °F (36.3 °C)  (12/06/20 0535)  Pulse: 81 (12/06/20 0535)  Resp: 20 (12/06/20 0535)  BP: (!) 172/96 (12/06/20 0535) Vital Signs (24h Range):  Temp:  [97.4 °F (36.3 °C)] 97.4 °F (36.3 °C)  Pulse:  [81] 81  Resp:  [20] 20  BP: (172)/(96) 172/96     Weight: 109.5 kg (241 lb 8.2 oz)  Body mass index is 35.67 kg/m².    Physical Exam  Vitals signs and nursing note reviewed.   Constitutional:       Comments: Sleeping in bed  Exam limited by patient participation   HENT:      Head: Normocephalic and atraumatic.   Cardiovascular:      Rate and Rhythm: Normal rate and regular rhythm.      Heart sounds: Normal heart sounds. No murmur.   Pulmonary:      Effort: Pulmonary effort is normal. No respiratory distress.      Breath sounds: Normal breath sounds.   Skin:     General: Skin is warm and dry.         Significant Labs:   BMP:   Recent Labs   Lab 12/05/20 2036   *      K 3.8      CO2 24   BUN 17   CREATININE 1.2   CALCIUM 8.9     CBC:   Recent Labs   Lab 12/05/20 2036   WBC 10.61   HGB 15.0   HCT 44.7        CMP:   Recent Labs   Lab 12/05/20 2036      K 3.8      CO2 24   *   BUN 17   CREATININE 1.2   CALCIUM 8.9   PROT 7.4   ALBUMIN 4.2   BILITOT 0.6   ALKPHOS 81   AST 31   ALT 48*   ANIONGAP 11   EGFRNONAA >60.0     Urine Studies:   Recent Labs   Lab 12/05/20 2031   COLORU Yellow   APPEARANCEUA Clear   PHUR 6.0   SPECGRAV 1.025   PROTEINUA Negative   GLUCUA Negative   KETONESU Negative   BILIRUBINUA Negative   OCCULTUA Negative   NITRITE Negative   LEUKOCYTESUR Negative       Significant Imaging: I have reviewed all pertinent imaging results/findings within the past 24 hours.

## 2020-12-06 NOTE — CONSULTS
"[facility] Hospital Medicine  Consult Note    Patient Name: Nathaniel Acharya  MRN: [id number]  Admission Date: 12/6/2020  Hospital Length of Stay: 0 days  Attending Physician: Justin Mix MD   Primary Care Provider: Primary Doctor No           Patient information was obtained from ER records.     Inpatient consult to [facility] for History and Physical  Consult performed by: Justin Oro MD  Consult ordered by: Justin Mix MD        Subjective:     Principal Problem: MDD (major depressive disorder), recurrent severe, without psychosis    Chief Complaint: No chief complaint on file.       HPI: Nathaniel Acharya is a 25 y.o. male with a past psychiatric history of depression, anxiety and PTSD, currently admitted to the inpatient unit with the following chief complaint: depression/SI, "Not right now.. I need to catch up on my sleep.. this is pissing me off."     The patient presented to the ER on 12/5/20 with complaints of depression/SI. Per staff notes:  -Pt reports to ED with [employer] fine after yearly psychological exam. Pt reports losing job and told psychologist that he could "blow his brains out" because he is struggling in life right now. pt reports that he was not serious about statement and denies SI/HI, hallucinations/dellusions. Pt has hx of PTSD and anxiety. Pt states, "I just need to be put on medication to help me."      Attempted interview but pt not cooperative.    Past Medical History:   Diagnosis Date    Anxiety     Depression     Hx of psychiatric care     PTSD (post-traumatic stress disorder)     Therapy        Past Surgical History:   Procedure Laterality Date    WISDOM TOOTH EXTRACTION  2017       Review of patient's allergies indicates:  No Known Allergies    No current facility-administered medications on file prior to encounter.      Current Outpatient Medications on File Prior to Encounter   Medication Sig    famotidine (PEPCID) 20 MG tablet " Take 1 tablet (20 mg total) by mouth 2 (two) times daily.    fluticasone propionate (FLONASE) 50 mcg/actuation nasal spray 1 spray (50 mcg total) by Each Nare route 2 (two) times daily.    [DISCONTINUED] acetaminophen (TYLENOL) 500 MG tablet Take 2 tablets (1,000 mg total) by mouth every 6 (six) hours as needed for Pain.    [DISCONTINUED] chlorhexidine (HIBICLENS) 4 % external liquid Apply topically daily as needed.    [DISCONTINUED] ibuprofen (ADVIL,MOTRIN) 600 MG tablet Take 1 tablet (600 mg total) by mouth every 6 (six) hours as needed for Pain (Take with food as needed for mild-to-moderate pain).    [DISCONTINUED] lidocaine HCl 2% (XYLOCAINE) 2 % Soln by Mucous Membrane route every 3 (three) hours. Apply 5 mL to painful area with syringe or tsp    [DISCONTINUED] mupirocin (BACTROBAN) 2 % ointment Apply to affected area 3 times daily    [DISCONTINUED] promethazine (PHENERGAN) 25 MG suppository Place 1 suppository (25 mg total) rectally every 6 (six) hours as needed (Use if  nausea not controlled with oral medication).     Family History     None        Tobacco Use    Smoking status: Never Smoker    Smokeless tobacco: Never Used   Substance and Sexual Activity    Alcohol use: No    Drug use: No     Comment: denies drug use    Sexual activity: Yes     Partners: Female     Review of Systems   Unable to perform ROS: Psychiatric disorder     Objective:     Vital Signs (Most Recent):  Temp: 97.4 °F (36.3 °C) (12/06/20 0535)  Pulse: 81 (12/06/20 0535)  Resp: 20 (12/06/20 0535)  BP: (!) 172/96 (12/06/20 0535) Vital Signs (24h Range):  Temp:  [97.4 °F (36.3 °C)] 97.4 °F (36.3 °C)  Pulse:  [81] 81  Resp:  [20] 20  BP: (172)/(96) 172/96     Weight: 109.5 kg (241 lb 8.2 oz)  Body mass index is 35.67 kg/m².    Physical Exam  Vitals signs and nursing note reviewed.   Constitutional:       Comments: Sleeping in bed  Exam limited by patient participation   HENT:      Head: Normocephalic and atraumatic.    Cardiovascular:      Rate and Rhythm: Normal rate and regular rhythm.      Heart sounds: Normal heart sounds. No murmur.   Pulmonary:      Effort: Pulmonary effort is normal. No respiratory distress.      Breath sounds: Normal breath sounds.   Skin:     General: Skin is warm and dry.         Significant Labs:   BMP:   Recent Labs   Lab 12/05/20 2036   *      K 3.8      CO2 24   BUN 17   CREATININE 1.2   CALCIUM 8.9     CBC:   Recent Labs   Lab 12/05/20 2036   WBC 10.61   HGB 15.0   HCT 44.7        CMP:   Recent Labs   Lab 12/05/20 2036      K 3.8      CO2 24   *   BUN 17   CREATININE 1.2   CALCIUM 8.9   PROT 7.4   ALBUMIN 4.2   BILITOT 0.6   ALKPHOS 81   AST 31   ALT 48*   ANIONGAP 11   EGFRNONAA >60.0     Urine Studies:   Recent Labs   Lab 12/05/20 2031   COLORU Yellow   APPEARANCEUA Clear   PHUR 6.0   SPECGRAV 1.025   PROTEINUA Negative   GLUCUA Negative   KETONESU Negative   BILIRUBINUA Negative   OCCULTUA Negative   NITRITE Negative   LEUKOCYTESUR Negative       Significant Imaging: I have reviewed all pertinent imaging results/findings within the past 24 hours.    Assessment/Plan:     * MDD (major depressive disorder), recurrent severe, without psychosis    Plan per psych    Depression    Plan per psych      VTE Risk Mitigation (From admission, onward)    None              Thank you for your consult. I will sign off. Please contact us if you have any additional questions.    Justin Oro MD  Department of Hospital Medicine   [facility]

## 2020-12-06 NOTE — HPI
"Nathaniel Acharya is a 25 y.o. male with a past psychiatric history of depression, anxiety and PTSD, currently admitted to the inpatient unit with the following chief complaint: depression/SI, "Not right now.. I need to catch up on my sleep.. this is pissing me off."     The patient presented to the ER on 12/5/20 with complaints of depression/SI. Per staff notes:  -Pt reports to ED with [employer] fine after yearly psychological exam. Pt reports losing job and told psychologist that he could "blow his brains out" because he is struggling in life right now. pt reports that he was not serious about statement and denies SI/HI, hallucinations/dellusions. Pt has hx of PTSD and anxiety. Pt states, "I just need to be put on medication to help me."      Attempted interview but pt not cooperative.  "

## 2020-12-06 NOTE — ED PROVIDER NOTES
Encounter Date: 12/5/2020       History     Chief Complaint   Patient presents with    Suicidal     Pt arrives with family for suicidal ideations. Pt's paperwork states pt has plan. Pt denies. Pt calm and cooperative in triage.     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  He was seen by a licensed social worker today where he made the claims.  See media tab for her note.  Patient is in [employer] and is having financial difficulties.  He is not currently on any psych medication.  He had another event or his weapons had to be confiscated.  Patient denies nausea, vomiting, diarrhea, fever, cough, shortness of breath, chest pain, abdominal pain, or dysuria.  A ten point review of systems was completed and is negative except as documented above.  Patient denies any other acute medical complaint.  The patients available PMH, PSH, Social History, medications, allergies, and triage vital signs were reviewed just prior to their medical evaluation.        Review of patient's allergies indicates:  No Known Allergies  Past Medical History:   Diagnosis Date    Anxiety     PTSD (post-traumatic stress disorder)      Past Surgical History:   Procedure Laterality Date    WISDOM TOOTH EXTRACTION  2017     No family history on file.  Social History     Tobacco Use    Smoking status: Never Smoker    Smokeless tobacco: Never Used   Substance Use Topics    Alcohol use: No    Drug use: No     Review of Systems   Constitutional: Negative for fever.   HENT: Negative for sore throat.    Eyes: Negative for visual disturbance.   Respiratory: Negative for cough and shortness of breath.    Cardiovascular: Negative for chest pain.   Gastrointestinal: Negative for abdominal pain, diarrhea, nausea and vomiting.   Genitourinary: Negative for dysuria.   Musculoskeletal: Negative for neck pain.   Skin: Negative for rash and wound.   Allergic/Immunologic: Negative  for immunocompromised state.   Neurological: Negative for syncope.   Psychiatric/Behavioral: Positive for self-injury and suicidal ideas. Negative for confusion.       Physical Exam     Initial Vitals [12/05/20 2014]   BP Pulse Resp Temp SpO2   (!) 166/85 83 14 98.9 °F (37.2 °C) 98 %      MAP       --         Physical Exam    Nursing note and vitals reviewed.  Constitutional: He appears well-developed and well-nourished. He is not diaphoretic. No distress.   HENT:   Head: Normocephalic and atraumatic.   Nose: Nose normal.   Eyes: Conjunctivae are normal. Right eye exhibits no discharge. Left eye exhibits no discharge.   Neck: Normal range of motion. Neck supple.   Cardiovascular: Normal rate, regular rhythm and normal heart sounds. Exam reveals no gallop and no friction rub.    No murmur heard.  Pulmonary/Chest: Breath sounds normal. No respiratory distress. He has no wheezes. He has no rhonchi. He has no rales.   Abdominal: Soft. He exhibits no distension. There is no abdominal tenderness. There is no rebound and no guarding.   Musculoskeletal: Normal range of motion. No tenderness or edema.   Neurological: He is alert and oriented to person, place, and time. He has normal strength. GCS score is 15. GCS eye subscore is 4. GCS verbal subscore is 5. GCS motor subscore is 6.   Skin: Skin is warm and dry. No rash noted. No erythema.   Psychiatric:   SI earlier, admits to anxiety and PTSD.  No acute agitation or psychosis on initial eval         ED Course   Procedures  Labs Reviewed   CBC W/ AUTO DIFFERENTIAL - Abnormal; Notable for the following components:       Result Value    Gran # (ANC) 7.9 (*)     Gran % 74.3 (*)     All other components within normal limits   URINALYSIS, REFLEX TO URINE CULTURE    Narrative:     Specimen Source->Urine   COMPREHENSIVE METABOLIC PANEL   TSH   DRUG SCREEN PANEL, URINE EMERGENCY   ALCOHOL,MEDICAL (ETHANOL)   ACETAMINOPHEN LEVEL   SARS-COV-2 RNA AMPLIFICATION, QUAL          Imaging  Results    None          Medical Decision Making:   History:   I obtained history from: someone other than patient.       <> Summary of History: Company commander  Old Medical Records: I decided to obtain old medical records.  Old Records Summarized: other records.       <> Summary of Records: LCSW note  Clinical Tests:   Lab Tests: Ordered and Reviewed  ED Management:  25-year-old male presents with suicidal ideation.  Vitals with hypertension.  Physical exam as above.  Labs pending.  PEC completed.  Will transfer to inpatient psych.  Did bedside teaching.  All questions answered.  Patient acknowledges understanding.                             Clinical Impression:     ICD-10-CM ICD-9-CM   1. Suicidal ideation  R45.851 V62.84   2. Medical clearance for psychiatric admission  Z00.8 V70.8                      Disposition:   Disposition: Transferred  Condition: Stable     ED Disposition Condition    Transfer to Another Facility Stable                            True Henderson MD  12/05/20 4024

## 2020-12-06 NOTE — ED NOTES
"Pt reports to ED with [employer] babatunde after yearly psychological exam. Pt reports losing job and told psychologist that he could "blow his brains out" because he is struggling in life right now. pt reports that he was not serious about statement and denies SI/HI, hallucinations/dellusions. Pt has hx of PTSD and anxiety. Pt states, "I just need to be put on medication to help me."    "

## 2020-12-06 NOTE — ED NOTES
The patient departed the [facility] in the care of [facility] transportation. All belongings were given to [facility] staff. The patient departed w/o any complaints.
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
## 2020-12-06 NOTE — PLAN OF CARE
Shift note : patient is cooperative . He states that he is tired and needs to sleep . He has been sleeping all morning . He comes out to eat his meals .

## 2020-12-07 PROBLEM — F33.2 MDD (MAJOR DEPRESSIVE DISORDER), RECURRENT SEVERE, WITHOUT PSYCHOSIS: Status: RESOLVED | Noted: 2020-12-06 | Resolved: 2020-12-07

## 2020-12-07 PROBLEM — F43.23 ADJUSTMENT REACTION WITH ANXIETY AND DEPRESSION: Status: ACTIVE | Noted: 2020-12-07

## 2020-12-07 PROBLEM — F32.A DEPRESSION: Status: RESOLVED | Noted: 2020-12-06 | Resolved: 2020-12-07

## 2020-12-07 LAB
HAV IGM SERPL QL IA: NEGATIVE
HBV CORE IGM SERPL QL IA: NEGATIVE
HBV SURFACE AG SERPL QL IA: NEGATIVE
HCV AB SERPL QL IA: NEGATIVE

## 2020-12-07 PROCEDURE — 99233 SBSQ HOSP IP/OBS HIGH 50: CPT | Mod: ,,, | Performed by: PSYCHIATRY & NEUROLOGY

## 2020-12-07 PROCEDURE — 99233 PR SUBSEQUENT HOSPITAL CARE,LEVL III: ICD-10-PCS | Mod: ,,, | Performed by: PSYCHIATRY & NEUROLOGY

## 2020-12-07 PROCEDURE — 25000003 PHARM REV CODE 250: Performed by: PSYCHIATRY & NEUROLOGY

## 2020-12-07 PROCEDURE — 90833 PSYTX W PT W E/M 30 MIN: CPT | Mod: ,,, | Performed by: PSYCHIATRY & NEUROLOGY

## 2020-12-07 PROCEDURE — 90833 PR PSYCHOTHERAPY W/PATIENT W/E&M, 30 MIN (ADD ON): ICD-10-PCS | Mod: ,,, | Performed by: PSYCHIATRY & NEUROLOGY

## 2020-12-07 PROCEDURE — 36415 COLL VENOUS BLD VENIPUNCTURE: CPT

## 2020-12-07 PROCEDURE — 11400000 HC PSYCH PRIVATE ROOM

## 2020-12-07 RX ORDER — SERTRALINE HYDROCHLORIDE 25 MG/1
25 TABLET, FILM COATED ORAL DAILY
Status: DISCONTINUED | OUTPATIENT
Start: 2020-12-07 | End: 2020-12-08 | Stop reason: HOSPADM

## 2020-12-07 RX ORDER — SERTRALINE HYDROCHLORIDE 50 MG/1
25 TABLET, FILM COATED ORAL DAILY
Qty: 30 TABLET | Refills: 2 | Status: SHIPPED | OUTPATIENT
Start: 2020-12-07 | End: 2021-12-07

## 2020-12-07 RX ADMIN — SERTRALINE HYDROCHLORIDE 25 MG: 25 TABLET ORAL at 12:12

## 2020-12-07 RX ADMIN — HYDROXYZINE PAMOATE 50 MG: 50 CAPSULE ORAL at 08:12

## 2020-12-07 RX ADMIN — THERA TABS 1 TABLET: TAB at 08:12

## 2020-12-07 RX ADMIN — FOLIC ACID 1 MG: 1 TABLET ORAL at 08:12
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
## 2020-12-07 NOTE — PLAN OF CARE
Behavior:  Patient attended and participated in psychotherapy group today. He was dressed in his own clothes and wearing a face mask.    Intervention:  Social support was discussed in psychotherapy group this date with an emphasis on helping patients identify their social support networks. These social supports are helpful to patients who are attempting to make a change in their substance use. Patients identified social supports using the handout A/M - 6.1 titled Where do I Get Help? from the group therapy for Substance Abuse, second edition, 2016, A Stages of Change Therapy Manual. Group Discussion was held about patients' social support, what it means to them, furthering their social support network, and the importance of being support to others.    Response:  The patient stated that his social support consists of Alexsander, his mother Lilliana, his [employer], his friend Yomaira Solis with the [employer], his daughter Jennifer, and his dog Dell.    Plan:   To continue to encourage patient to attend group psychotherapy and to learn more about ways that he may identify social support networks.

## 2020-12-07 NOTE — PLAN OF CARE
"Pt was sleeping in room until vital signs.  Up for snacks.  Standing at nurses station telling staff in angry voice and affect that "this is bullshit."  When asked pt what he meant he said that this is like being in penitentiary and he wants to leave.  Encouraged pt to make sure he is awake to talk to the doctor tomorrow.  Says he talked to his girlfriend today and she is ready for him to come home.  Pt says he lied and said he was suicidal to get here.  Pt continues to focus on meds.  Did spend some time in dayroom talking to male peer and was calmer after.  Vistaril prn provided.  No distress noted.  Will continue to monitor for safety.  "
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
## 2020-12-07 NOTE — PLAN OF CARE
Care plan reviewed, patient agrees with plan. Denies suicidal ideations and homicidal ideations. No evidence of self-harm. States is somewhat depressed. Medication compliant. Good appetite. Gait steady, no falls. States slept well last night.  Stressed importance of patience's individualized safety plan, effective coping skills, and resolution of depression and suicidal ideations. Will continue to monitor for safety.

## 2020-12-07 NOTE — PLAN OF CARE
Pt lying quiet in bed, eyes closed, respiration even and unlabored, appearing asleep.  Slept well all shift since 2130.  Safety and precautions maintained with rounds every 15 minutes, bed is fixed in a low position and pathways kept clear.  No fall occurred.

## 2020-12-07 NOTE — PLAN OF CARE
Recommendation:   1. Regular diet Appropriate   2. Encourage pt to increase intake of meals and snacks   3. If intake is <50% please provide pt with Boost    Goals: meal consumption to increase to at least 50-75% by f/u  Nutrition Goal Status: new    Interventions:  General Healthful Diet    Nutrition Discharge Planning: Regular Diet

## 2020-12-07 NOTE — CONSULTS
"  [facility]  Adult Nutrition  Consult Note    SUMMARY     Recommendations    Recommendation:   1. Regular diet Appropriate   2. Encourage pt to increase intake of meals and snacks   3. If intake is <50% please provide pt with Boost    Goals: meal consumption to increase to at least 50-75% by f/u  Nutrition Goal Status: new  Communication of RD Recs: (POC)    Reason for Assessment    Reason For Assessment: consult  Diagnosis: psychological disorder  Relevant Medical History: anxiety, depression, PTSD    General Information Comments: In the past 24 hrs, 0-50% of meals and only 1 snack. Wt gain noted over the past 10 months. NFPE not completed per psych status.     Nutrition Discharge Planning: Regular Diet    Nutrition Risk Screen    Nutrition Risk Screen: no indicators present    Nutrition/Diet History    Food Allergies: NKFA  Factors Affecting Nutritional Intake: depression, socio-economic    Anthropometrics    Temp: 97.7 °F (36.5 °C)  Height Method: Stated  Height: 5' 9" (175.3 cm)  Height (inches): 69 in  Weight Method: Standard Scale  Weight: 109.5 kg (241 lb 8.2 oz)  Weight (lb): 241.52 lb  Ideal Body Weight (IBW), Male: 160 lb  % Ideal Body Weight, Male (lb): 150.95 %  BMI (Calculated): 35.6  BMI Grade: 35 - 39.9 - obesity - grade II     Lab/Procedures/Meds    Pertinent Labs Reviewed: reviewed  Pertinent Labs Comments: 12/5: glucose 147, ALT 48 12/6: CHOL 206, HDL 37  Pertinent Medications Reviewed: reviewed  Pertinent Medications Comments: Folic aicd, MVI    Estimated/Assessed Needs    Weight Used For Calorie Calculations: 109.5 kg (241 lb 6.5 oz)  Energy Calorie Requirements (kcal): 2080  Energy Need Method: [place]-St Jeor(no AF)  Protein Requirements:  g(.8-1 g/kg)  Weight Used For Protein Calculations: 109.5 kg (241 lb 6.5 oz)     Estimated Fluid Requirement Method: RDA Method  RDA Method (mL): 2080     Nutrition Prescription Ordered    Current Diet Order: Regular " Diet    Evaluation of Received Nutrient/Fluid Intake    Fluid Required: meeting needs  % Intake of Estimated Energy Needs: 25 - 50 %  % Meal Intake: 0-50%    Nutrition Risk    Level of Risk/Frequency of Follow-up: low - moderate     Assessment and Plan  Nutrition Problem:  Inadequate energy intake    Related to (etiology):   depression    Signs and Symptoms (as evidenced by):   Meal intake of 0-50% and minimal intake of snacks    Interventions:  General Healthful Diet    Nutrition Diagnosis Status:   New      Monitor and Evaluation    Food and Nutrient Intake: energy intake, food and beverage intake  Food and Nutrient Adminstration: diet order  Anthropometric Measurements: weight, weight change  Biochemical Data, Medical Tests and Procedures: glucose/endocrine profile, lipid profile  Nutrition-Focused Physical Findings: overall appearance     Nutrition Follow-Up    RD Follow-up?: Yes

## 2020-12-07 NOTE — PROGRESS NOTES
12/07/20 1040   [facility] Group Therapy   Group Name Therapeutic Recreation   Specific Interventions Cognitive Stimulation Training   Participation Level Appropriate;Attentive;Sharing   Participation Quality Cooperative;Social   Insight/Motivation Applies New Skills;Good   Affect/Mood Display Appropriate   Cognition Alert   Psychomotor WNL   Patient smiling, shows interest and remembers rules and directions, becomes involved, shows friendly competition, wanted to win, enjoys the activity.

## 2020-12-07 NOTE — PLAN OF CARE
Collateral Contact Information     Document information pertaining to:      Reason for admission - describe what happened  LMSW spoke to the patient's girlfriend, Moses Salcedo, [phone]. She stated that the patient needs help with depression. He has not been sleeping or eating. He is also stressed because he doesn't have a job.     They have a one-year old daughter and she is pregnant.    Moses will have her mother drive her to pick the patient up when discharged. They will be coming from [place]. She has a doctor's appointment at 2 p.m. and asks that we discharge him early so that she can make her doctor's appointment.    Prior treatment places and dates  None      Reason for prior treatment - same or different                           How long has patient had problem (s)     Substance abuse- what, how much, how often and how long?  No. he told her that he was not using drugs.     Legal issues - Current charges, type of offense, probation or parole.  He was arrested before, but she does not know what he was arrested for.     History of suicide attempt - when and what methods, did they require medical attention  None     Impulse issues -      History of violence - describe behaviors and triggers  None. He has a punching bag that he uses when he is upset. He also exercises.     Any guns or weapons in the home? If yes, recommend that these be secured.  Yes, he has a gun. She stated that he has not voiced suicidal ideations to her.     What is patient's Baseline behavior / mood - how do they know if patient is doing well?  He is happy and wants to go and do things when he is well.     What helps the patient stay well?                 Internal / external coping strategies (attending meetings, going to groups,               Taking medications, spending time with family, etc.)      Discharge plan:  Where will the patient live upon discharge?  He will live with her and their daughter.     Who else is in the  home?  Mother and father     Will someone be able to pick the patient up when discharged?  She will have her mother drive her to pick the patient up.

## 2020-12-08 VITALS
DIASTOLIC BLOOD PRESSURE: 66 MMHG | TEMPERATURE: 99 F | SYSTOLIC BLOOD PRESSURE: 127 MMHG | HEIGHT: 69 IN | WEIGHT: 241.38 LBS | HEART RATE: 56 BPM | BODY MASS INDEX: 35.75 KG/M2 | RESPIRATION RATE: 18 BRPM

## 2020-12-08 PROCEDURE — 99239 HOSP IP/OBS DSCHRG MGMT >30: CPT | Mod: ,,, | Performed by: PSYCHIATRY & NEUROLOGY

## 2020-12-08 PROCEDURE — 90833 PR PSYCHOTHERAPY W/PATIENT W/E&M, 30 MIN (ADD ON): ICD-10-PCS | Mod: ,,, | Performed by: PSYCHIATRY & NEUROLOGY

## 2020-12-08 PROCEDURE — 25000003 PHARM REV CODE 250: Performed by: PSYCHIATRY & NEUROLOGY

## 2020-12-08 PROCEDURE — 90833 PSYTX W PT W E/M 30 MIN: CPT | Mod: ,,, | Performed by: PSYCHIATRY & NEUROLOGY

## 2020-12-08 PROCEDURE — 99239 PR HOSPITAL DISCHARGE DAY,>30 MIN: ICD-10-PCS | Mod: ,,, | Performed by: PSYCHIATRY & NEUROLOGY

## 2020-12-08 RX ADMIN — FOLIC ACID 1 MG: 1 TABLET ORAL at 08:12

## 2020-12-08 RX ADMIN — THERA TABS 1 TABLET: TAB at 08:12

## 2020-12-08 RX ADMIN — SERTRALINE HYDROCHLORIDE 25 MG: 25 TABLET ORAL at 08:12

## 2020-12-08 NOTE — PROGRESS NOTES
PSYCHOTHERAPY ADD-ON +34234   30 (16-37*) minutes     Time: 16 minutes  Participants: Met with patient     Therapeutic Intervention Type: behavior modifying psychotherapy, supportive psychotherapy  Why chosen therapy is appropriate versus another modality: relevant to diagnosis, patient responds to this modality, evidence based practice     Target symptoms: depression, anxiety   Primary focus: depression, anxiety, and psychosocial stressors  Psychotherapeutic techniques: supportive and problem solving techniques; psycho-education; safety planning and wrap up session     Outcome monitoring methods: self-report, observation     Patient's response to intervention:  The patient's response to intervention is accepting.     Progress toward goals:  The patient's progress toward goals is good.     Justin Mix MD  Psychiatry
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
## 2020-12-08 NOTE — PLAN OF CARE
Patient states he hopes he sleeps tonight because he is ready for discharge in the morning, calm and cooperative. Vs stable, appetite good, taking medications as ordered. Promoted safety plan, effective coping skills, mood improvement, absence of SI/HI will continue to monitor safety.

## 2020-12-08 NOTE — CARE UPDATE
Nathaniel Acharya MRN:[id number] ([facility]#[id number]) (25 y.o. M) (Adm: 20)  [facility] [id number]  Patient Demographics    Patient Name   Nathaniel Acharya Sex   Male          Age   1995 (25 y.o.) N   [id number] Address   [address] [address] Phone   [phone] (Home)   [phone] (Mobile) *Preferred*   Patient Ethnicity & Race    Ethnic Group Patient Race   /White White   Patient Demographics    Address   [address] [address] Phone   [phone] (Home)   [phone] (Mobile) *Preferred* E-mail Address   [email or web address]   PCP and Center    Primary Care Provider Center   Primary Doctor No None   Emergency Contact(s)    Name Relation Home Work Mobile   MatiasFidelinamary Significant other   [phone]   Documents on File     Status Date Received Description   Documents for the Patient   Notice of Privacy Pract Ackn Signed 10/11/17    Insurance Documents Received 10/11/17 [employer] PPO   Patient ID Received 18 LA DL    Clinic Authorization Signed () 18 Consent/Self   Provider Based Acknowledgement      Plain Language Summary English No, Patient Declined Print () 10/11/17    [facility] Provider Based Facility Disclosure      Miscellaneous Documents clinic   FA ISREAL 17   Insurance Documents Received 18 [facility]   Advance Directive Acknowledgement      Plain Language Summary English Acknowledged () 05/15/19    Consents      Patient ID   LADMEHUL ex [id number]   Clinic Authorization Signed 20    Plain Language Summary English Acknowledged () 20 OVIDIO   Plain Language Summary English Acknowledged () 20 Financial Asst info   [facility] Contracted Facility Disclosure Unable to Obtain 20    Insurance Verification Fax Received 20 [facility] verification fax   Patient Photo   Photo of Patient   Documents for the Encounter   Medicare Lifetime Reserve Form      Important Medicare Message Printed at Discharge      Advance  Beneficiary Notice      Hospital Authorization Unable to Obtain 12/06/20 [facility] ADMIT   Flowsheets Nursing Received 12/06/20    Patient Instructions Attachment   Anxiety Reaction (English)   Patient Instructions Attachment   Anxiety, Your Body's Response to  (English)   After Visit Summary   IP PSYCH AVS   Patient Instructions Attachment  (Deleted)  Depression (English)   Admission Information    Current Information    Attending Provider Admitting Provider Admission Type Admission Status   MD Justin Morris MD Urgent Admission (Confirmed)          Admission Date/Time Discharge Date Hospital Service Auth/Cert Status   12/06/20  05:28 AM  Psychiatry Incomplete          Hospital Area Unit Room/Bed    [facility] 213/213            Discharge Disposition Discharge Destination   Home or Self Care    Admission    Complaint      Hospital Account    Name Acct ID Class Status Primary Coverage   Nathaniel Acharya [id number] IP- Psych Open [employer] - [employer] ([facility])          Guarantor Account (for Hospital Account #[id number])    Name Relation to Pt Service Area Active? Acct Type   Nathaniel Acharya Self [facility] Yes Behavioral Health   Address Phone     [address] [address] [phone](H)            Coverage Information (for Hospital Account #[id number])    F/O Payor/Plan Precert #   [employer] ([facility])    Subscriber Subscriber #   Lilliana Dawson [id number]   Address Phone   [address] [address]   [place] [address] [phone]

## 2020-12-08 NOTE — PLAN OF CARE
Care plan reviewed, patient agrees with plan. Denies suicidal ideations and homicidal ideations. No evidence of self-harm. States is less depressed. Medication compliant. Accepts all food offered on unit. Gait steady, no falls. States slept well again last night.  Stressed importance of patience's individualized safety plan, effective coping skills, and resolution of depression and suicidal ideations. Will continue to monitor for safety.

## 2020-12-08 NOTE — PSYCH
Patient will be following up with [facility] at [address] in [place], [phone].  Patient will be contacted by facility to set up aftercare.  Patient does not use tobacco products but will receive substance abuse therapy and addictions counseling due to a positive UDS on admit.  AVS faxed on 12/8/2020 at 2:09 pm to [phone].

## 2021-04-16 ENCOUNTER — PATIENT MESSAGE (OUTPATIENT)
Dept: RESEARCH | Facility: HOSPITAL | Age: 26
End: 2021-04-16

## 2024-04-23 NOTE — PLAN OF CARE
Patient discharged from U. Discharge instruction, referral and after-care info given, importance of medication compliance reviewed. AVS reviewed and issued to patient.    Detail Level: Detailed Quality 226: Preventive Care And Screening: Tobacco Use: Screening And Cessation Intervention: Patient screened for tobacco use and is an ex/non-smoker
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
